# Patient Record
(demographics unavailable — no encounter records)

---

## 2025-05-19 NOTE — CARDIOLOGY SUMMARY
[de-identified] : 5/19/25-  nsr [de-identified] : 1/24nsr rare ectopy no correlation [de-identified] : 1/23  7min 56  [de-identified] : 2/7/2022-normal echo  ef 58%

## 2025-05-19 NOTE — CARDIOLOGY SUMMARY
[de-identified] : 5/19/25-  nsr [de-identified] : 1/24nsr rare ectopy no correlation [de-identified] : 1/23  7min 56  [de-identified] : 2/7/2022-normal echo  ef 58%

## 2025-05-19 NOTE — HISTORY OF PRESENT ILLNESS
[FreeTextEntry1] : Kurtis was referred by Dr Eng for chest pain and an abnormal ekg in December 2021.  There have been no hospitalizations since last visit. He has gone to urgent care for URI.  He gets sharp chest pain pinpoint of the mid chest and a point on his left pectoral area, 4 times this year, occurs at rest or exertion, seconds, He had shortness of breath once this year for one minute. He has the feeling of a fast heartbeat once a week, at rest, skipping and fast, lasts seconds.   He exercises daily weights and cardio. 2 days a weeks, Goes  to the gym sometimes He takes an energy drink when he is tired.